# Patient Record
Sex: FEMALE | Race: WHITE | URBAN - METROPOLITAN AREA
[De-identification: names, ages, dates, MRNs, and addresses within clinical notes are randomized per-mention and may not be internally consistent; named-entity substitution may affect disease eponyms.]

---
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
## 2018-09-11 RX ORDER — NORETHINDRONE ACETATE AND ETHINYL ESTRADIOL 1; 20 MG/1; UG/1
TABLET ORAL
Qty: 21 TABLET | Refills: 0 | Status: SHIPPED | OUTPATIENT
Start: 2018-09-11 | End: 2018-10-12 | Stop reason: SDUPTHER

## 2018-10-12 ENCOUNTER — OFFICE VISIT (OUTPATIENT)
Dept: OBGYN CLINIC | Age: 18
End: 2018-10-12
Payer: COMMERCIAL

## 2018-10-12 VITALS
HEIGHT: 68 IN | DIASTOLIC BLOOD PRESSURE: 70 MMHG | SYSTOLIC BLOOD PRESSURE: 102 MMHG | BODY MASS INDEX: 24.28 KG/M2 | WEIGHT: 160.2 LBS

## 2018-10-12 DIAGNOSIS — Z00.00 WELL WOMAN EXAM WITHOUT GYNECOLOGICAL EXAM: Primary | ICD-10-CM

## 2018-10-12 PROCEDURE — 99395 PREV VISIT EST AGE 18-39: CPT | Performed by: NURSE PRACTITIONER

## 2018-10-12 RX ORDER — METHYLPHENIDATE HYDROCHLORIDE EXTENDED RELEASE 20 MG/1
20 TABLET ORAL
COMMUNITY
Start: 2016-09-21 | End: 2020-02-12

## 2018-10-12 RX ORDER — NORETHINDRONE ACETATE AND ETHINYL ESTRADIOL 1; .02 MG/1; MG/1
TABLET ORAL
Qty: 21 TABLET | Refills: 12 | Status: SHIPPED | OUTPATIENT
Start: 2018-10-12 | End: 2020-02-12 | Stop reason: SDUPTHER

## 2018-10-12 ASSESSMENT — ENCOUNTER SYMPTOMS
BACK PAIN: 0
DIARRHEA: 0
CONSTIPATION: 0
SHORTNESS OF BREATH: 0
ABDOMINAL PAIN: 0
ABDOMINAL DISTENTION: 0
COUGH: 0

## 2018-10-12 NOTE — PROGRESS NOTES
week        Subjective:      Review of Systems   Constitutional: Negative for appetite change and fatigue. HENT: Negative for congestion and hearing loss. Eyes: Negative for visual disturbance. Respiratory: Negative for cough and shortness of breath. Cardiovascular: Negative for chest pain and palpitations. Gastrointestinal: Negative for abdominal distention, abdominal pain, constipation and diarrhea. Genitourinary: Negative for flank pain, frequency, menstrual problem, pelvic pain and vaginal discharge. Musculoskeletal: Negative for back pain. Neurological: Negative for syncope and headaches. Psychiatric/Behavioral: Negative for behavioral problems. Objective:     /70 (Site: Right Upper Arm, Position: Sitting, Cuff Size: Medium Adult)   Ht 5' 7.5\" (1.715 m)   Wt 160 lb 3.2 oz (72.7 kg)   LMP 10/07/2018   Breastfeeding? No   BMI 24.72 kg/m²   Physical Exam   Constitutional: She is oriented to person, place, and time. HENT:   Head: Normocephalic and atraumatic. Eyes: Conjunctivae and EOM are normal.   Neck: Normal range of motion. Cardiovascular: Normal rate and regular rhythm. Pulmonary/Chest: Effort normal and breath sounds normal.   Abdominal: Soft. Bowel sounds are normal.   Musculoskeletal: Normal range of motion. Neurological: She is alert and oriented to person, place, and time. Skin: Skin is warm and dry. Psychiatric: Judgment normal.         Assessment:     1. Well woman exam without gynecological exam    contraception counseling        Plan:   1. Pap collected. Discussed new pap smear guidelines. Desires re-pap in 1 year. 2. Breast self exam reviewed  3. Calcium and Vitamin D dosing reviewed. 4. Seat belt use reviewed  5. Family planning reviewed. Birth control ocp  Gardasil status doesn't think she has had it- will check.  Information given  Electronicallysigned by [place] on 10/12/2018

## 2018-10-12 NOTE — PATIENT INSTRUCTIONS
this medication only for the indication prescribed. Every effort has been made to ensure that the information provided by [address] is accurate, up-to-date, and complete, but no guarantee is made to that effect. Drug information contained herein may be time sensitive. [facility] information has been compiled for use by healthcare practitioners and consumers in the [place] and therefore [facility] does not warrant that uses outside of the [place] are appropriate, unless specifically indicated otherwise. [facility]'s drug information does not endorse drugs, diagnose patients or recommend therapy. [facility]'s drug information is an informational resource designed to assist licensed healthcare practitioners in caring for their patients and/or to serve consumers viewing this service as a supplement to, and not a substitute for, the expertise, skill, knowledge and judgment of healthcare practitioners. The absence of a warning for a given drug or drug combination in no way should be construed to indicate that the drug or drug combination is safe, effective or appropriate for any given patient. [facility] does not assume any responsibility for any aspect of healthcare administered with the aid of information [facility] provides. The information contained herein is not intended to cover all possible uses, directions, precautions, warnings, drug interactions, allergic reactions, or adverse effects. If you have questions about the drugs you are taking, check with your doctor, nurse or pharmacist.  Copyright 7857-5552 [address] [place] [address] Dr NEIL. Version: 7.02. Revision date: 7/28/2017. Care instructions adapted under license by [facility] ([facility]). If you have questions about a medical condition or this instruction, always ask your healthcare professional. [place] [address] any warranty or liability for your use of this information.           Intrauterine Device (IUD) Insertion: Care Instructions  Your Care whole month, you will not get your period as often. Or you may not get it at all. How well do they work? In the first year of use:  · When combination pills are taken exactly as directed, fewer than 1 woman out of 100 has an unplanned pregnancy. · When pills are not taken exactly as directed, such as forgetting to take them sometimes, 9 women out of 100 have an unplanned pregnancy. Be sure to tell your doctor about any health problems you have or medicines you take. He or she can help you choose the birth control method that is right for you. What are the advantages of combination pills? · These pills work better than barrier methods. Barrier methods include condoms and diaphragms. · They may reduce acne and heavy bleeding. They may also reduce cramping and other symptoms of PMS (premenstrual syndrome). · The pills let you control your periods. You can have periods every month or every few months. Or you can choose not to have them at all. · You don't have to interrupt sex to use the pills. What are the disadvantages of combination pills? · You have to take a pill at the same time every day to prevent pregnancy. · Combination pills don't protect against sexually transmitted infections (STIs), such as herpes or HIV/AIDS. If you aren't sure if your sex partner might have an STI, use a condom to protect against disease. · They may cause changes in your period. You may have little bleeding, skipped periods, or spotting. · They may cause mood changes, less interest in sex, or weight gain. · Combination pills contain estrogen. They may not be right for you if you have certain health problems. Where can you learn more? Go to [email or web address] and sign in to your [employer] account. Enter J654 in the [facility] box to learn more about \"Learning About Birth Control: Combination Pills. \"     If you do not have an account, please click on the \"Sign Up Now\" link.   Current as of:

## 2019-12-24 RX ORDER — NORETHINDRONE ACETATE AND ETHINYL ESTRADIOL 1; .02 MG/1; MG/1
TABLET ORAL
Qty: 63 TABLET | Refills: 4 | OUTPATIENT
Start: 2019-12-24

## 2020-02-12 ENCOUNTER — HOSPITAL ENCOUNTER (OUTPATIENT)
Age: 20
Setting detail: SPECIMEN
Discharge: HOME | End: 2020-02-12
Payer: COMMERCIAL

## 2020-02-12 ENCOUNTER — OFFICE VISIT (OUTPATIENT)
Dept: OBGYN CLINIC | Age: 20
End: 2020-02-12
Payer: COMMERCIAL

## 2020-02-12 VITALS
DIASTOLIC BLOOD PRESSURE: 60 MMHG | WEIGHT: 154 LBS | HEIGHT: 68 IN | BODY MASS INDEX: 23.34 KG/M2 | SYSTOLIC BLOOD PRESSURE: 90 MMHG

## 2020-02-12 PROCEDURE — 99395 PREV VISIT EST AGE 18-39: CPT | Performed by: NURSE PRACTITIONER

## 2020-02-12 RX ORDER — NORETHINDRONE ACETATE AND ETHINYL ESTRADIOL 1; .02 MG/1; MG/1
TABLET ORAL
Qty: 21 TABLET | Refills: 12 | Status: SHIPPED | OUTPATIENT
Start: 2020-02-12 | End: 2021-04-07

## 2020-02-12 ASSESSMENT — ENCOUNTER SYMPTOMS
CONSTIPATION: 0
COUGH: 0
ABDOMINAL DISTENTION: 0
ABDOMINAL PAIN: 0
BACK PAIN: 0
SHORTNESS OF BREATH: 0
DIARRHEA: 0

## 2020-02-12 NOTE — PROGRESS NOTES
HPI:     Kenna Donahue a 22 y.o. female who presents today for:  Chief Complaint   Patient presents with    Annual Exam     Prev Pap: 1st pap       HPI  Here for annual exam. Charlene P[place] to [place] studying [employer] and works at the [employer]. No children. Eats healthy and exercises daily. GYNECOLOGICHISTORY:  MenstrualHistory: Patient's last menstrual period was 01/29/2020. Sexually Transmitted Infections: No  History of Ectopic Pregnancy:No  Menarche: 10  Cycles q monthly     Durationof cycles: 3-4  Dysmenorrhea: minimal  Sexually active: yes  Dyspareunia: none    Current Outpatient Medications   Medication Sig Dispense Refill    norethindrone-ethinyl estradiol (JUNEL 1/20) 1-20 MG-MCG per tablet TAKE ONE TABLET BY MOUTH DAILY 21 tablet 12     No current facility-administered medications for this visit. Allergies   Allergen Reactions    Anesthetics, Amide Other (See Comments)     Malignant hyperthermia    Pcn [Penicillins] Hives       Past Medical History:   Diagnosis Date    ADD (attention deficit disorder)      Denies family history of breast, ovarian, uterus, colon CA     Past Surgical History:   Procedure Laterality Date    WISDOM TOOTH EXTRACTION  12/2016     Family History   Problem Relation Age of Onset    Osteoporosis Maternal Grandmother     Cancer Maternal Grandfather         bone/eosphageal    Lung Cancer Paternal Grandmother         (former smoker)    Breast Cancer Maternal Aunt 43    Hypertension Maternal Uncle     COPD Maternal Uncle         smoker    Mult Sclerosis Maternal Aunt     No Known Problems Mother     No Known Problems Father     No Known Problems Paternal Grandfather      Social History     Tobacco Use    Smoking status: Never Smoker    Smokeless tobacco: Never Used   Substance Use Topics    Alcohol use:  Yes     Alcohol/week: 1.0 standard drinks     Types: 1 Shots of liquor per week        Subjective:      Review of Systems   Constitutional: Negative for appetite fullness. Left: No mass, tenderness or fullness. Musculoskeletal:         General: No tenderness. Lymphadenopathy:      Cervical: No cervical adenopathy. Skin:     General: Skin is warm and dry. Neurological:      Mental Status: She is alert and oriented to person, place, and time. Psychiatric:         Behavior: Behavior normal.         Thought Content: Thought content normal.         Judgment: Judgment normal.           Assessment:     1. Encounter for gynecological examination          Plan:   1. Pap collected. Discussed new pap smear guidelines. Desires re-pap in 1 year. 2. Breast self exam reviewed  3. Calcium and Vitamin D dosing reviewed. 4. Seat belt use reviewed  5. Family planning reviewed.   Birth control ocp- refills sent  Gardasil status given info  Electronicallysigned by Wendie Raymond on 2/12/2020

## 2020-02-12 NOTE — PATIENT INSTRUCTIONS
Patient Education          human papillomavirus (HPV) vaccine, quadrivalent  Pronunciation:  HYOO man pap il [place] ma VI derick vax EEN, kwa dri VAY lent  Brand:  Gardasil  What is the most important information I should know about human papillomavirus vaccine? You should not receive a booster vaccine if you have had a life-threatening allergic reaction after the first shot. You may feel faint during the first 15 minutes after receiving this vaccine. Some people have had seizure-like reactions after receiving this vaccine. What is human papillomavirus vaccine? Human papillomavirus (HPV) can cause genital warts, cancer of the cervix, anal cancer, and various cancers of the vulva or vagina. Gardasil (HPV quadrivalent vaccine) and Gardasil 9 (HPV 9-valent vaccine) are used in both females and males. Another form of HPV vaccine (Cervarix) is used only in females. This medication guide provides information only for Gardasil (HPV quadrivalent vaccine). HPV quadrivalent vaccine is used in girls and young women ages 5 through 32 to prevent cervical/vaginal/anal cancers caused by certain types of HPV. HPV quadrivalent vaccine is also used in boys and young men ages 5 through 32 to prevent anal cancer or genital warts caused by certain types of HPV. You may receive this vaccine even if you have already had genital warts, or had a positive HPV test or abnormal pap smear in the past. However, this vaccine will not treat  active genital warts or HPV-related cancers, and it will not cure HPV infection. HPV quadrivalent vaccine only prevents diseases caused by HPV types 6, 11, 16, and 18. It will not prevent diseases caused by other types of HPV. The Centers for Disease Control and Prevention (CDC) recommends HPV vaccine for all boys and girls ages 6or 15years old. The vaccine is also recommended in teenage boys and girls who have not already received the vaccine or have not completed all booster shots.   Like any vaccine, the HPV quadrivalent vaccine may not provide protection from disease in every person. HPV quadrivalent vaccine may also be used for other purposes not listed in this medication guide. What should I discuss with my health care provider before receiving human papillomavirus vaccine? You should not receive a booster vaccine if you have had a life-threatening allergic reaction after the first shot. To make sure HPV vaccine is safe for you, tell your doctor if you have:  · a fever;  · a weak immune system;  · an allergy to yeast; or  · if you are being treated with cancer medicine, steroids, or other drugs that can weaken your immune system. This vaccine is not expected to harm an unborn baby. Tell your doctor if you are pregnant or plan to become pregnant. It is not known whether HPV vaccine passes into breast milk or if it could harm a nursing baby. Tell your doctor if you are breast-feeding a baby. HPV vaccine will not protect against sexually transmitted diseases  such as chlamydia, gonorrhea, herpes, HIV, syphilis, and trichomoniasis. How is human papillomavirus vaccine given? HPV vaccine is given as an injection (shot) into a muscle in your upper arm or thigh. You will receive this injection in a doctor's office or other clinic setting. HPV quadrivalent vaccine is given in a series of 3 shots. You may have the first shot at any time as long as you are between the ages of 5and 32years old. Then you will need to receive a second dose 2 months after your first shot, and a third dose 6 months after your first shot. Be sure you receive all recommended doses of this vaccine. If you do not receive the full series of vaccines, you may not be fully protected against the disease. An HPV vaccine should not be used in place of having a routine pelvic exam, Pap smear, or anal exam to screen for cervical or anal cancer. What happens if I miss a dose?   Contact your doctor if you will miss a booster dose or if you all possible uses, directions, precautions, warnings, drug interactions, allergic reactions, or adverse effects. If you have questions about the drugs you are taking, check with your doctor, nurse or pharmacist.  Copyright 0355-2936 [address]. Version: 8.02. Revision date: 9/1/2015. Care instructions adapted under license by [facility] ([facility]). If you have questions about a medical condition or this instruction, always ask your healthcare professional. [place] [address] any warranty or liability for your use of this information.

## 2020-02-21 LAB — CYTOLOGY REPORT: NORMAL

## 2021-04-07 RX ORDER — NORETHINDRONE ACETATE AND ETHINYL ESTRADIOL 1; .02 MG/1; MG/1
TABLET ORAL
Qty: 84 TABLET | Refills: 0 | Status: SHIPPED | OUTPATIENT
Start: 2021-04-07 | End: 2021-07-01

## 2021-07-01 RX ORDER — NORETHINDRONE ACETATE AND ETHINYL ESTRADIOL 1; .02 MG/1; MG/1
TABLET ORAL
Qty: 84 TABLET | Refills: 0 | Status: SHIPPED | OUTPATIENT
Start: 2021-07-01

## 2021-10-11 ASSESSMENT — ENCOUNTER SYMPTOMS
ABDOMINAL PAIN: 0
COUGH: 0
DIARRHEA: 0
BACK PAIN: 0
SHORTNESS OF BREATH: 0
CONSTIPATION: 0
ABDOMINAL DISTENTION: 0

## 2021-10-11 NOTE — PATIENT INSTRUCTIONS
Patient Education        human papillomavirus (HPV) vaccine, quadrivalent  Pronunciation:  HYOO man pap il [place] ma VI derick artemio Burton  Brand:  Gardasil  What is the most important information I should know about human papillomavirus vaccine? You should not receive a booster vaccine if you have had a life-threatening allergic reaction after the first shot. You may feel faint during the first 15 minutes after receiving this vaccine. Some people have had seizure-like reactions after receiving this vaccine. What is human papillomavirus vaccine, quadrivalent? Human papillomavirus (HPV) is a sexually transmitted disease that can cause genital warts. HPV can also cause anal cancer or various cancers of the cervix, vagina, vulva, oropharynx (the middle part of the throat), or head and neck. Gardasil (HPV quadrivalent vaccine) and Gardasil 9 (HPV 9-valent vaccine) are two different brands of HPV vaccine that are used in different age groups to prevent different types of cancers. This medication guide provides information only for Gardasil (HPV quadrivalent vaccine). HPV quadrivalent vaccine (Gardasil) is used in children and adults ages 5 through 32 to prevent genital warts, anal cancer, or cancer of the cervix, vagina, or vulva caused by certain types of HPV. You may receive this vaccine even if you have already had genital warts, or had a positive HPV test or abnormal pap smear in the past. However, this vaccine will not treat  active genital warts or HPV-related cancers, and it will not cure HPV infection. HPV quadrivalent vaccine only prevents diseases caused by HPV types 6, 11, 16, and 18. It will not prevent diseases caused by other types of HPV. The Centers for Disease Control and Prevention (CDC) recommends HPV vaccine for all boys and girls ages 6or 15years old.  The vaccine is also recommended in teenage boys and girls who have not already received the vaccine or have not completed all booster shots. Like any vaccine, the HPV quadrivalent vaccine may not provide protection from disease in every person. HPV quadrivalent vaccine may also be used for other purposes not listed in this medication guide. What should I discuss with my health care provider before receiving human papillomavirus vaccine? You should not receive this vaccine if you have ever had a life-threatening allergic reaction to an HPV vaccine, or if you are allergic to yeast.  Tell your doctor if you have ever had:  · a fever higher than 100 degrees F (37.8 degrees C);  · a weak immune system; or  · treatment with cancer medicine, steroids, or other drugs that can weaken your immune system. You should not receive this vaccine if you are pregnant. It is not known whether HPV quadrivalent vaccine passes into breast milk or if it could harm a nursing baby. Tell your doctor if you are breast-feeding a baby. HPV vaccine will not protect against sexually transmitted diseases  such as chlamydia, gonorrhea, herpes, HIV, syphilis, and trichomoniasis. How is human papillomavirus vaccine given? HPV vaccine is given as an injection (shot) into a muscle in your upper arm or thigh. You will receive this injection in a doctor's office or other clinic setting. HPV quadrivalent vaccine is given in a series of 3 shots. You may have the first shot at any time as long as you are between the ages of 5and 32years old. Then you will need to receive a second dose 2 months after your first shot, and a third dose 6 months after your first shot. Be sure you receive all recommended doses of this vaccine. If you do not receive the full series of vaccines, you may not be fully protected against the disease. An HPV vaccine should not be used in place of having a routine pelvic exam, Pap smear, or anal exam to screen for cervical or anal cancer. What happens if I miss a dose?   Contact your doctor if you will miss a booster dose or if you get behind schedule. The next dose should be given as soon as possible. There is no need to start over. What happens if I overdose? An overdose of this vaccine is unlikely to occur. What should I avoid while receiving human papillomavirus vaccine? Follow your doctor's instructions about any restrictions on food, beverages, or activity. What are the possible side effects of human papillomavirus vaccine? Get emergency medical help if you have signs of an allergic reaction: hives; difficulty breathing; swelling of your face, lips, tongue, or throat. Keep track of any and all side effects you have after receiving this vaccine. When you receive a booster dose, you will need to tell the doctor if the previous shot caused any side effects. You may feel faint after receiving this vaccine. Some people have had seizure-like reactions after receiving a human papilloma virus vaccine. Your doctor may want you to remain under observation during the first 15 minutes after the injection. Developing cancer from HPV is much more dangerous to your health than receiving the vaccine to protect against it. However, like any medicine, this vaccine can cause side effects but the risk of serious side effects is extremely low. Call your doctor at once if you have:  · severe stomach pain;  · fever, chills, swollen glands, general ill feeling;  · easy bruising or bleeding, unusual weakness;  · joint pain, muscle pain or weakness;  · confusion, seizure (convulsions);  · chest pain; or  · feeling short of breath. Common side effects may include:  · pain, swelling, bruising, redness, or itching where the shot was given;  · nausea, vomiting;  · headache, dizziness; or  · fever. This is not a complete list of side effects and others may occur. Call your doctor for medical advice about side effects. You may report vaccine side effects to the [place] [address] and Human Services at [phone].   What other drugs will affect human papillomavirus vaccine? Other drugs may interact with HPV quadrivalent vaccine, including prescription and over-the-counter medicines, vitamins, and herbal products. Tell each of your health care providers about all medicines you use now and any medicine you start or stop using while you are receiving the series of HPV quadrivalent vaccines. Where can I get more information? Your doctor or pharmacist can provide more information about this vaccine. Additional information is available from your local health department or the Centers for Disease Control and Prevention. Remember, keep this and all other medicines out of the reach of children, never share your medicines with others, and use this medication only for the indication prescribed. Every effort has been made to ensure that the information provided by [facility] is accurate, up-to-date, and complete, but no guarantee is made to that effect. Drug information contained herein may be time sensitive. [employer] information has been compiled for use by healthcare practitioners and consumers in the [place] and therefore [facility] does not warrant that uses outside of the [place] are appropriate, unless specifically indicated otherwise. [facility]'s drug information does not endorse drugs, diagnose patients or recommend therapy. [facility] drug information is an informational resource designed to assist licensed healthcare practitioners in caring for their patients and/or to serve consumers viewing this service as a supplement to, and not a substitute for, the expertise, skill, knowledge and judgment of healthcare practitioners. The absence of a warning for a given drug or drug combination in no way should be construed to indicate that the drug or drug combination is safe, effective or appropriate for any given patient.  [facility] does not assume any responsibility for any aspect of healthcare administered with the aid of information [facility] provides. The information contained herein is not intended to cover all possible uses, directions, precautions, warnings, drug interactions, allergic reactions, or adverse effects. If you have questions about the drugs you are taking, check with your doctor, nurse or pharmacist.  Copyright 2059-6490 [address]: 9.02. Revision date: 3/14/2021. Care instructions adapted under license by [facility] ([facility]). If you have questions about a medical condition or this instruction, always ask your healthcare professional. [place] [address] any warranty or liability for your use of this information.

## 2021-10-11 NOTE — PROGRESS NOTES
Substance and Sexual Activity   Alcohol Use Yes    Alcohol/week: 1.0 standard drinks    Types: 1 Shots of liquor per week     Current Outpatient Medications   Medication Sig Dispense Refill    norethindrone-ethinyl estradiol (MICROGESTIN ) 1-20 MG-MCG per tablet TAKE 1 TABLET DAILY 84 tablet 0     No current facility-administered medications for this visit. Allergies: Allergies   Allergen Reactions    Amoxicillin-Pot Clavulanate     Anesthetics, Amide Other (See Comments)     Malignant hyperthermia    Azithromycin     Cefixime     Pcn [Penicillins] Hives       Gynecologic History:  No LMP recorded. Sexually Active: Yes  Partners: men  Dyspareunia: none  STD History:No  Birth Control: Yes OCP  Pap History:  negative  Gardasil: information given  Family hx Breast, Ovarian, Colorectal Cancer: denies       OB History    Para Term  AB Living   0 0 0 0 0 0   SAB TAB Ectopic Molar Multiple Live Births   0 0 0 0 0 0       Review of Systems   Constitutional: Negative for appetite change and fatigue. HENT: Negative for congestion and hearing loss. Eyes: Negative for visual disturbance. Respiratory: Negative for cough and shortness of breath. Cardiovascular: Negative for chest pain and palpitations. Gastrointestinal: Negative for abdominal distention, abdominal pain, constipation and diarrhea. Genitourinary: Positive for vaginal discharge (increased d/c, w/odor). Negative for flank pain, frequency, menstrual problem and pelvic pain. Musculoskeletal: Negative for back pain. Neurological: Negative for syncope and headaches. Psychiatric/Behavioral: Negative for behavioral problems. There were no vitals taken for this visit. Physical Exam  Constitutional:       Appearance: She is well-developed. HENT:      Head: Normocephalic. Eyes:      Extraocular Movements: Extraocular movements intact.       Conjunctiva/sclera: Conjunctivae normal.   Neck: Thyroid: No thyromegaly. Trachea: No tracheal deviation. Pulmonary:      Effort: Pulmonary effort is normal. No respiratory distress. Chest:      Breasts: Breasts are symmetrical.         Right: No inverted nipple. Left: No inverted nipple, mass, nipple discharge, skin change or tenderness. Abdominal:      General: There is no distension. Palpations: Abdomen is soft. There is no mass. Tenderness: There is no abdominal tenderness. Genitourinary:     Labia:         Right: No rash or lesion. Left: No rash or lesion. Vagina: Vaginal discharge present. No tenderness. Cervix: Discharge (thick yellow w/odor) present. Uterus: Normal. Not deviated, not enlarged and not fixed. Adnexa:         Right: No mass, tenderness or fullness. Left: No mass, tenderness or fullness. Musculoskeletal:         General: No tenderness. Normal range of motion. Cervical back: Normal range of motion. Skin:     General: Skin is warm and dry. Neurological:      General: No focal deficit present. Mental Status: She is alert and oriented to person, place, and time. Mental status is at baseline. Psychiatric:         Mood and Affect: Mood normal.         Behavior: Behavior normal.         Thought Content: Thought content normal.         Judgment: Judgment normal.             ASSESSMENT:     19 y.o. Female; Annual   Diagnosis Orders   1. Encounter for well woman exam with routine gynecological exam     2. Oral contraceptive pill surveillance     3. Vaginal discharge  VAGINITIS DNA PROBE    Chlamydia Trachomatis & Neisseria gonorrhoeae (GC) by amplified detection   4. Routine screening for STI (sexually transmitted infection)  VAGINITIS DNA PROBE    Chlamydia Trachomatis & Neisseria gonorrhoeae (GC) by amplified detection     No follow-ups on file. PLAN:  - Pap collected per ASCCP Guidelines. - Birth control discussed.   - Smoking risk factors

## 2021-10-12 ENCOUNTER — OFFICE VISIT (OUTPATIENT)
Dept: OBGYN CLINIC | Age: 21
End: 2021-10-12
Payer: COMMERCIAL

## 2021-10-12 ENCOUNTER — HOSPITAL ENCOUNTER (OUTPATIENT)
Age: 21
Setting detail: SPECIMEN
Discharge: HOME | End: 2021-10-12
Payer: COMMERCIAL

## 2021-10-12 VITALS
SYSTOLIC BLOOD PRESSURE: 102 MMHG | BODY MASS INDEX: 24.43 KG/M2 | WEIGHT: 161.2 LBS | DIASTOLIC BLOOD PRESSURE: 72 MMHG | HEIGHT: 68 IN

## 2021-10-12 DIAGNOSIS — Z01.419 ENCOUNTER FOR WELL WOMAN EXAM WITH ROUTINE GYNECOLOGICAL EXAM: Primary | ICD-10-CM

## 2021-10-12 DIAGNOSIS — Z30.41 ORAL CONTRACEPTIVE PILL SURVEILLANCE: ICD-10-CM

## 2021-10-12 DIAGNOSIS — Z11.3 ROUTINE SCREENING FOR STI (SEXUALLY TRANSMITTED INFECTION): ICD-10-CM

## 2021-10-12 DIAGNOSIS — N89.8 VAGINAL DISCHARGE: ICD-10-CM

## 2021-10-12 PROCEDURE — 99395 PREV VISIT EST AGE 18-39: CPT | Performed by: NURSE PRACTITIONER

## 2021-10-13 DIAGNOSIS — N89.8 VAGINAL DISCHARGE: ICD-10-CM

## 2021-10-13 DIAGNOSIS — Z11.3 ROUTINE SCREENING FOR STI (SEXUALLY TRANSMITTED INFECTION): ICD-10-CM

## 2021-10-13 LAB
DIRECT EXAM: NORMAL
Lab: NORMAL
SPECIMEN DESCRIPTION: NORMAL

## 2021-10-14 LAB
C TRACH DNA GENITAL QL NAA+PROBE: NEGATIVE
N. GONORRHOEAE DNA: NEGATIVE
SPECIMEN DESCRIPTION: NORMAL

## 2021-10-18 LAB — CYTOLOGY REPORT: NORMAL

## 2021-10-25 ENCOUNTER — TELEPHONE (OUTPATIENT)
Dept: OBGYN CLINIC | Age: 21
End: 2021-10-25

## 2021-10-25 DIAGNOSIS — Z30.41 ORAL CONTRACEPTIVE PILL SURVEILLANCE: Primary | ICD-10-CM

## 2021-10-25 RX ORDER — NORETHINDRONE ACETATE AND ETHINYL ESTRADIOL 1MG-20(21)
1 KIT ORAL DAILY
Qty: 3 PACKET | Refills: 3 | Status: SHIPPED | OUTPATIENT
Start: 2021-10-25 | End: 2022-09-27 | Stop reason: SDUPTHER

## 2021-10-25 NOTE — TELEPHONE ENCOUNTER
20 y/o Pt calling asking where her Rx BCP was sent last week when she had her annual exam.     Advised:  -Rx was not refilled but we can do that today and confirmed to send to [facility].   (1 yr refill sent)

## 2022-09-26 DIAGNOSIS — Z30.41 ORAL CONTRACEPTIVE PILL SURVEILLANCE: ICD-10-CM

## 2022-09-27 RX ORDER — NORETHINDRONE ACETATE AND ETHINYL ESTRADIOL 1MG-20(21)
1 KIT ORAL DAILY
Qty: 90 TABLET | Refills: 0 | Status: SHIPPED | OUTPATIENT
Start: 2022-09-27 | End: 2022-12-26

## 2023-01-05 ENCOUNTER — HOSPITAL ENCOUNTER (OUTPATIENT)
Age: 23
Setting detail: SPECIMEN
Discharge: HOME | End: 2023-01-05

## 2023-01-05 ENCOUNTER — OFFICE VISIT (OUTPATIENT)
Dept: OBGYN CLINIC | Age: 23
End: 2023-01-05
Payer: COMMERCIAL

## 2023-01-05 VITALS
HEIGHT: 68 IN | DIASTOLIC BLOOD PRESSURE: 78 MMHG | SYSTOLIC BLOOD PRESSURE: 130 MMHG | HEART RATE: 68 BPM | WEIGHT: 152 LBS | BODY MASS INDEX: 23.04 KG/M2

## 2023-01-05 DIAGNOSIS — Z01.419 ENCOUNTER FOR WELL WOMAN EXAM WITH ROUTINE GYNECOLOGICAL EXAM: ICD-10-CM

## 2023-01-05 DIAGNOSIS — N89.8 VAGINAL DISCHARGE: Primary | ICD-10-CM

## 2023-01-05 DIAGNOSIS — Z30.41 ORAL CONTRACEPTIVE PILL SURVEILLANCE: ICD-10-CM

## 2023-01-05 DIAGNOSIS — N89.8 VAGINAL ODOR: ICD-10-CM

## 2023-01-05 DIAGNOSIS — N89.8 VAGINAL DISCHARGE: ICD-10-CM

## 2023-01-05 PROCEDURE — 99395 PREV VISIT EST AGE 18-39: CPT | Performed by: OBSTETRICS & GYNECOLOGY

## 2023-01-05 ASSESSMENT — ENCOUNTER SYMPTOMS
DIARRHEA: 0
WHEEZING: 0
COUGH: 0
NAUSEA: 0
VOMITING: 0
CONSTIPATION: 0
ABDOMINAL PAIN: 0

## 2023-01-05 NOTE — PROGRESS NOTES
[facility]  [facility] [address] [place] [address] Road [address]  Dept: [phone]    DATE OF VISIT:  23      History and Physical    Radha Fiore    :  2000  CHIEF COMPLAINT:    Chief Complaint   Patient presents with    Vaginal Discharge                    HPI :   Radha Fiore is a 23 y.o. female here for her annual exam. She is sexually active. She uses OCPs for contraception. Periods are regular, occurring every 28 days and lasting 3 days, uses just a few pads per day. Pap is up to date, done 10/2021.  Her boyfriend recently cheated on her and she has an odor so she would like STI testing.     _____________________________________________________________________  Past Medical History:   Diagnosis Date    ADD (attention deficit disorder)                                                                    Past Surgical History:   Procedure Laterality Date    WISDOM TOOTH EXTRACTION  2016     Family History   Problem Relation Age of Onset    Osteoporosis Maternal Grandmother     Cancer Maternal Grandfather         bone/eosphageal    Lung Cancer Paternal Grandmother         (former smoker)    Breast Cancer Maternal Aunt 43    Hypertension Maternal Uncle     COPD Maternal Uncle         smoker    Mult Sclerosis Maternal Aunt     No Known Problems Mother     No Known Problems Father     No Known Problems Paternal Grandfather      Social History     Tobacco Use   Smoking Status Never   Smokeless Tobacco Never     Social History     Substance and Sexual Activity   Alcohol Use Yes    Alcohol/week: 1.0 standard drink    Types: 1 Shots of liquor per week     Current Outpatient Medications   Medication Sig Dispense Refill    norethindrone-ethinyl estradiol (MICROGESTIN 1/20) 1-20 MG-MCG per tablet TAKE 1 TABLET DAILY 84 tablet 0    norethindrone-ethinyl estradiol (LOESTRIN FE 1/20) 1-20 MG-MCG per tablet Take 1 tablet by mouth daily 90 tablet 0     No current facility-administered medications for this visit. Allergies: Allergies   Allergen Reactions    Amoxicillin-Pot Clavulanate     Anesthetics, Amide Other (See Comments)     Malignant hyperthermia    Azithromycin     Cefixime     Pcn [Penicillins] Hives       Gynecologic History:  No LMP recorded. Sexually Active: Yes  STD History:No  Birth Control: Yes OCPs  Pap History: negative    OB History    Para Term  AB Living   0 0 0 0 0 0   SAB IAB Ectopic Molar Multiple Live Births   0 0 0 0 0 0       Review of Systems   Constitutional:  Negative for chills and fever. HENT:  Negative for hearing loss. Respiratory:  Negative for cough and wheezing. Cardiovascular:  Negative for chest pain and palpitations. Gastrointestinal:  Negative for abdominal pain, constipation, diarrhea, nausea and vomiting. Genitourinary:  Negative for dysuria, frequency and urgency. Musculoskeletal:  Negative for myalgias. Skin:  Negative for rash. Neurological:  Negative for dizziness, weakness and headaches. Hematological:  Does not bruise/bleed easily. Psychiatric/Behavioral:  Negative for suicidal ideas. /78 (Site: Left Upper Arm, Position: Sitting, Cuff Size: Medium Adult)   Pulse 68   Ht 5' 8\" (1.727 m)   Wt 152 lb (68.9 kg)   BMI 23.11 kg/m²       Physical Exam  Constitutional:       Appearance: She is well-developed. HENT:      Head: Normocephalic. Neck:      Thyroid: No thyromegaly. Cardiovascular:      Rate and Rhythm: Normal rate and regular rhythm. Pulmonary:      Effort: Pulmonary effort is normal. No respiratory distress. Abdominal:      General: There is no distension. Palpations: Abdomen is soft. Tenderness: There is no abdominal tenderness. Genitourinary:     Labia:         Right: No rash, tenderness, lesion or injury. Left: No rash, tenderness, lesion or injury. Vagina: No signs of injury and foreign body.  No vaginal discharge, erythema or tenderness. Cervix: No cervical motion tenderness, discharge or friability. Uterus: Not deviated, not enlarged, not fixed and not tender. Adnexa:         Right: No mass, tenderness or fullness. Left: No mass, tenderness or fullness. Musculoskeletal:         General: Normal range of motion. Cervical back: Normal range of motion. Skin:     General: Skin is warm and dry. Neurological:      Mental Status: She is alert and oriented to person, place, and time. Psychiatric:         Behavior: Behavior normal.         Thought Content: Thought content normal.         Judgment: Judgment normal.           ASSESSMENT:     23 y.o. Female; Annual   Diagnosis Orders   1. Vaginal discharge  Vaginitis DNA Probe    C.trachomatis N.gonorrhoeae DNA      2. Vaginal odor  Vaginitis DNA Probe    C.trachomatis N.gonorrhoeae DNA      3. Encounter for well woman exam with routine gynecological exam        4. Oral contraceptive pill surveillance          Return in about 1 year (around 1/5/2024) for annual exam.                PLAN:  - Pap deferred, up to date. - Birth control discussed. Continue current OCP. Pt already has refills. - Routine health maintenance per patient's PCP.     Electronically signed by Aishwarya Moran MD on 1/5/23 at 9:56 AM EST  [facility]

## 2023-01-06 ENCOUNTER — TELEPHONE (OUTPATIENT)
Dept: OBGYN CLINIC | Age: 23
End: 2023-01-06

## 2023-01-06 DIAGNOSIS — A74.9 CHLAMYDIA: Primary | ICD-10-CM

## 2023-01-06 DIAGNOSIS — Z20.2 CHLAMYDIA CONTACT: ICD-10-CM

## 2023-01-06 LAB
C TRACH DNA GENITAL QL NAA+PROBE: ABNORMAL
CANDIDA SPECIES, DNA PROBE: NEGATIVE
GARDNERELLA VAGINALIS, DNA PROBE: POSITIVE
N. GONORRHOEAE DNA: NEGATIVE
SOURCE: ABNORMAL
SPECIMEN DESCRIPTION: ABNORMAL
TRICHOMONAS VAGINALIS DNA: NEGATIVE

## 2023-01-06 RX ORDER — AZITHROMYCIN 500 MG/1
1000 TABLET, FILM COATED ORAL ONCE
Qty: 2 TABLET | Refills: 0 | Status: SHIPPED | OUTPATIENT
Start: 2023-01-06 | End: 2023-01-06

## 2023-01-06 RX ORDER — METRONIDAZOLE 500 MG/1
500 TABLET ORAL 2 TIMES DAILY
Qty: 14 TABLET | Refills: 0 | Status: SHIPPED | OUTPATIENT
Start: 2023-01-06 | End: 2023-01-13

## 2023-01-06 RX ORDER — DOXYCYCLINE HYCLATE 100 MG
100 TABLET ORAL 2 TIMES DAILY
Qty: 14 TABLET | Refills: 0 | Status: SHIPPED | OUTPATIENT
Start: 2023-01-06 | End: 2023-01-13

## 2023-01-06 NOTE — TELEPHONE ENCOUNTER
Chlamydia on swab. Rx sent in for azithromycin. Rx also sent in for partner to take Doxycycline. DO NOT TAKE DOXY if PREGNANT.     Thanks,    DO Zehra Sorto [facility]   1/6/2023, 4:39 PM

## 2023-01-06 NOTE — TELEPHONE ENCOUNTER
Pt called she seen Julisa Chambers yesterday she tested positive for chlamdyia she is wondering if she can get something sent in please advise

## 2023-04-04 ENCOUNTER — NURSE ONLY (OUTPATIENT)
Dept: OBGYN CLINIC | Age: 23
End: 2023-04-04
Payer: COMMERCIAL

## 2023-04-04 VITALS — SYSTOLIC BLOOD PRESSURE: 128 MMHG | DIASTOLIC BLOOD PRESSURE: 82 MMHG

## 2023-04-04 DIAGNOSIS — Z23 NEED FOR TDAP VACCINATION: Primary | ICD-10-CM

## 2023-04-04 PROCEDURE — 90715 TDAP VACCINE 7 YRS/> IM: CPT | Performed by: NURSE PRACTITIONER

## 2023-04-04 PROCEDURE — 90471 IMMUNIZATION ADMIN: CPT | Performed by: NURSE PRACTITIONER

## 2023-04-04 NOTE — PROGRESS NOTES
After obtaining verbal consent, and per orders of Earnestine Longoria CNP, injection of Tdap 0.5mL given in Left deltoid IM by Papa Durham MA. Patient instructed to remain in clinic for 20 minutes afterwards, and to report any adverse reaction to me immediately.     [facility] Tdap 05012-321-30   LOT [id number]   EXP 6-13-25    Last injection: N/A  Next injection: N/A  Refills left: N/A    Last seen: 1/5/2023  Next appt: Visit date not found
(See Comments)     Malignant hyperthermia    Azithromycin     Cefixime     Pcn [Penicillins] Hives         No flowsheet data found. **If either question is answered in a  positive fashion then complete the PHQ9 Scoring Evaluation and make the appropriate referral**      Gynecologic History:  No LMP recorded. Sexually Active: {YES / [id number]}  Discussed using condoms for STI protection ***  Dyspareunia: ***  STD History: {YES/NO:622101582}  Birth Control: {YES/NO:893475356}  Family hx Breast, Ovarian, Colorectal Cancer: ***       OB History    Para Term  AB Living   0 0 0 0 0 0   SAB IAB Ectopic Molar Multiple Live Births   0 0 0 0 0 0       Review of Systems    Physical Exam         ASSESSMENT/PLAN:    23 y.o. Female; Annual   Diagnosis Orders   1. Encounter for well woman exam                 1. Encounter for well woman exam               - Pap collected per ASCCP Guidelines. - Birth control discussed. - Smoking risk factors discussed  - Diet and exercise reviewed. - Routine health maintenance per patient's PCP. No follow-ups on file.         Electronically signed by CHRIS Zimmer CNP on 23 at 1:11 PM EDT

## 2023-08-21 DIAGNOSIS — Z30.41 ORAL CONTRACEPTIVE PILL SURVEILLANCE: ICD-10-CM

## 2023-08-22 RX ORDER — NORETHINDRONE ACETATE AND ETHINYL ESTRADIOL AND FERROUS FUMARATE 1MG-20(21)
KIT ORAL
Qty: 84 TABLET | Refills: 3 | Status: SHIPPED | OUTPATIENT
Start: 2023-08-22

## 2023-09-28 ENCOUNTER — TELEPHONE (OUTPATIENT)
Dept: OBGYN CLINIC | Age: 23
End: 2023-09-28

## 2023-09-28 DIAGNOSIS — Z30.41 ORAL CONTRACEPTIVE PILL SURVEILLANCE: ICD-10-CM

## 2023-09-28 RX ORDER — NORETHINDRONE ACETATE AND ETHINYL ESTRADIOL 1MG-20(21)
1 KIT ORAL DAILY
Qty: 90 TABLET | Refills: 6 | Status: SHIPPED | OUTPATIENT
Start: 2023-09-28

## 2023-09-28 NOTE — TELEPHONE ENCOUNTER
GYN pt called in stating she was sent in a different BC than she normally takes and she has been experiencing some mood swings and feels different and was wondering if she could have the [facility] sent in to her pharmacy.

## 2024-11-12 DIAGNOSIS — Z30.41 ORAL CONTRACEPTIVE PILL SURVEILLANCE: ICD-10-CM

## 2024-11-12 RX ORDER — NORETHINDRONE ACETATE AND ETHINYL ESTRADIOL 1MG-20(21)
1 KIT ORAL DAILY
Qty: 90 TABLET | Refills: 6 | Status: SHIPPED | OUTPATIENT
Start: 2024-11-12

## 2024-11-12 NOTE — TELEPHONE ENCOUNTER
Pt called stating she got off her dads insurance and [facility] discounted her scripts and she needs a new one sent in to [facility]